# Patient Record
Sex: MALE | Race: WHITE | NOT HISPANIC OR LATINO | URBAN - METROPOLITAN AREA
[De-identification: names, ages, dates, MRNs, and addresses within clinical notes are randomized per-mention and may not be internally consistent; named-entity substitution may affect disease eponyms.]

---

## 2023-06-24 ENCOUNTER — EMERGENCY (EMERGENCY)
Facility: HOSPITAL | Age: 23
LOS: 1 days | Discharge: HOME | End: 2023-06-24
Admitting: EMERGENCY MEDICINE
Payer: MEDICAID

## 2023-06-24 VITALS
HEART RATE: 71 BPM | OXYGEN SATURATION: 100 % | DIASTOLIC BLOOD PRESSURE: 66 MMHG | SYSTOLIC BLOOD PRESSURE: 129 MMHG | RESPIRATION RATE: 17 BRPM | TEMPERATURE: 98 F

## 2023-06-24 VITALS
OXYGEN SATURATION: 100 % | HEART RATE: 65 BPM | TEMPERATURE: 98 F | DIASTOLIC BLOOD PRESSURE: 60 MMHG | SYSTOLIC BLOOD PRESSURE: 118 MMHG | RESPIRATION RATE: 18 BRPM

## 2023-06-24 PROCEDURE — 73630 X-RAY EXAM OF FOOT: CPT | Mod: 26,RT

## 2023-06-24 PROCEDURE — 73610 X-RAY EXAM OF ANKLE: CPT | Mod: 26,RT

## 2023-06-24 PROCEDURE — 99284 EMERGENCY DEPT VISIT MOD MDM: CPT

## 2023-06-24 RX ORDER — IBUPROFEN 200 MG
600 TABLET ORAL ONCE
Refills: 0 | Status: COMPLETED | OUTPATIENT
Start: 2023-06-24 | End: 2023-06-24

## 2023-06-24 RX ADMIN — Medication 600 MILLIGRAM(S): at 12:03

## 2023-06-24 NOTE — ED PROVIDER NOTE - LOWER EXTREMITY EXAM, RIGHT
no obvious swelling, erythema, or deformity, + TTP to distal 3-5th MT, no ttp to lateral or medial malleolus or toes, FROM, sensation and strength intact/TENDERNESS

## 2023-06-24 NOTE — ED ADULT TRIAGE NOTE - CHIEF COMPLAINT QUOTE
Patient states he was doing pull ups and landed on R foot wrong. Complaining of pain and swelling, able to walk on it and moves it.

## 2023-06-24 NOTE — ED PROVIDER NOTE - OBJECTIVE STATEMENT
22 y/o M no PMH c/o right foot pain s/p fall from pull up bar yesterday. Pt was initially able to weight bear but after waking up this am pt states too painful to put weight on right foot. Denies fever, chills, subsequent other bodily injury from fall, numbness/tingling/weakness.

## 2023-06-24 NOTE — ED PROVIDER NOTE - PATIENT PORTAL LINK FT
You can access the FollowMyHealth Patient Portal offered by [facility] by registering at the following website: [email or web address]. By joining [facility]’s FollowMyHealth portal, you will also be able to view your health information using other applications (apps) compatible with our system.

## 2023-06-24 NOTE — ED PROVIDER NOTE - NSFOLLOWUPINSTRUCTIONS_ED_ALL_ED_FT
Rest, ice, elevate area.  Take Motrin 600mg every 8 hrs with food for pain.  Alternate with Tylenol 500mg 1-2 tabs every 6 hours as needed. Follow up with PMD within 48-72 hrs.  Follow up with Podiatry within the week for post hospital visit. Any worsening pain, swelling, weakness, numbness return to ED.

## 2023-06-24 NOTE — ED PROVIDER NOTE - SKIN, MLM
small abrasion to dorsum of foot, otherwise skin normal color for race, warm, dry and intact. No evidence of rash.